# Patient Record
Sex: MALE | Race: WHITE | NOT HISPANIC OR LATINO | URBAN - METROPOLITAN AREA
[De-identification: names, ages, dates, MRNs, and addresses within clinical notes are randomized per-mention and may not be internally consistent; named-entity substitution may affect disease eponyms.]

---

## 2023-06-29 LAB
ALANINE AMINOTRANSFERASE (SGPT) (U/L) IN SER/PLAS: 19 U/L (ref 10–52)
ALBUMIN (G/DL) IN SER/PLAS: 4.1 G/DL (ref 3.4–5)
ALKALINE PHOSPHATASE (U/L) IN SER/PLAS: 52 U/L (ref 33–120)
ANION GAP IN SER/PLAS: 11 MMOL/L (ref 10–20)
ASPARTATE AMINOTRANSFERASE (SGOT) (U/L) IN SER/PLAS: 20 U/L (ref 9–39)
BILIRUBIN TOTAL (MG/DL) IN SER/PLAS: 0.6 MG/DL (ref 0–1.2)
CALCIUM (MG/DL) IN SER/PLAS: 9.2 MG/DL (ref 8.6–10.3)
CARBON DIOXIDE, TOTAL (MMOL/L) IN SER/PLAS: 27 MMOL/L (ref 21–32)
CHLORIDE (MMOL/L) IN SER/PLAS: 105 MMOL/L (ref 98–107)
CHOLESTEROL (MG/DL) IN SER/PLAS: 188 MG/DL (ref 0–199)
CHOLESTEROL IN HDL (MG/DL) IN SER/PLAS: 58 MG/DL
CHOLESTEROL/HDL RATIO: 3.2
CREATININE (MG/DL) IN SER/PLAS: 0.99 MG/DL (ref 0.5–1.3)
ERYTHROCYTE DISTRIBUTION WIDTH (RATIO) BY AUTOMATED COUNT: 13.1 % (ref 11.5–14.5)
ERYTHROCYTE MEAN CORPUSCULAR HEMOGLOBIN CONCENTRATION (G/DL) BY AUTOMATED: 33.4 G/DL (ref 32–36)
ERYTHROCYTE MEAN CORPUSCULAR VOLUME (FL) BY AUTOMATED COUNT: 85 FL (ref 80–100)
ERYTHROCYTES (10*6/UL) IN BLOOD BY AUTOMATED COUNT: 5.06 X10E12/L (ref 4.5–5.9)
ESTIMATED AVERAGE GLUCOSE FOR HBA1C: 108 MG/DL
GFR MALE: 88 ML/MIN/1.73M2
GLUCOSE (MG/DL) IN SER/PLAS: 81 MG/DL (ref 74–99)
HEMATOCRIT (%) IN BLOOD BY AUTOMATED COUNT: 43.1 % (ref 41–52)
HEMOGLOBIN (G/DL) IN BLOOD: 14.4 G/DL (ref 13.5–17.5)
HEMOGLOBIN A1C/HEMOGLOBIN TOTAL IN BLOOD: 5.4 %
LDL: 111 MG/DL (ref 0–99)
LEUKOCYTES (10*3/UL) IN BLOOD BY AUTOMATED COUNT: 6.2 X10E9/L (ref 4.4–11.3)
PLATELETS (10*3/UL) IN BLOOD AUTOMATED COUNT: 240 X10E9/L (ref 150–450)
POTASSIUM (MMOL/L) IN SER/PLAS: 4.1 MMOL/L (ref 3.5–5.3)
PROTEIN TOTAL: 7 G/DL (ref 6.4–8.2)
SODIUM (MMOL/L) IN SER/PLAS: 139 MMOL/L (ref 136–145)
THYROTROPIN (MIU/L) IN SER/PLAS BY DETECTION LIMIT <= 0.05 MIU/L: 2.8 MIU/L (ref 0.44–3.98)
TRIGLYCERIDE (MG/DL) IN SER/PLAS: 97 MG/DL (ref 0–149)
UREA NITROGEN (MG/DL) IN SER/PLAS: 19 MG/DL (ref 6–23)
VLDL: 19 MG/DL (ref 0–40)

## 2023-06-30 NOTE — RESULT ENCOUNTER NOTE
Cholesterol looks okay at 188, HDL 58, , triglycerides 97    Thyroid well within normal limits    Sugar, kidneys, liver, electrolytes are all within normal limits    Hemoglobin A1c within normal limits as well as his complete blood cell count

## 2023-07-02 ASSESSMENT — PROMIS GLOBAL HEALTH SCALE
CARRYOUT_PHYSICAL_ACTIVITIES: COMPLETELY
EMOTIONAL_PROBLEMS: OFTEN
CARRYOUT_SOCIAL_ACTIVITIES: GOOD
RATE_PHYSICAL_HEALTH: GOOD
RATE_SOCIAL_SATISFACTION: GOOD
RATE_AVERAGE_FATIGUE: MODERATE
RATE_MENTAL_HEALTH: FAIR
RATE_AVERAGE_PAIN: 1
RATE_GENERAL_HEALTH: GOOD
RATE_QUALITY_OF_LIFE: GOOD

## 2023-07-03 ENCOUNTER — OFFICE VISIT (OUTPATIENT)
Dept: PRIMARY CARE | Facility: CLINIC | Age: 58
End: 2023-07-03
Payer: COMMERCIAL

## 2023-07-03 VITALS
HEIGHT: 67 IN | BODY MASS INDEX: 25.58 KG/M2 | SYSTOLIC BLOOD PRESSURE: 116 MMHG | HEART RATE: 76 BPM | DIASTOLIC BLOOD PRESSURE: 70 MMHG | WEIGHT: 163 LBS

## 2023-07-03 DIAGNOSIS — Z00.00 HEALTHCARE MAINTENANCE: Primary | ICD-10-CM

## 2023-07-03 DIAGNOSIS — J34.89 NASAL LESION: ICD-10-CM

## 2023-07-03 DIAGNOSIS — I10 HTN (HYPERTENSION), BENIGN: ICD-10-CM

## 2023-07-03 DIAGNOSIS — E78.00 ELEVATED LDL CHOLESTEROL LEVEL: ICD-10-CM

## 2023-07-03 DIAGNOSIS — Z23 NEED FOR SHINGLES VACCINE: ICD-10-CM

## 2023-07-03 PROBLEM — K64.9 HEMORRHOIDS: Status: ACTIVE | Noted: 2023-07-03

## 2023-07-03 LAB
POC APPEARANCE, URINE: CLEAR
POC BILIRUBIN, URINE: NEGATIVE
POC BLOOD, URINE: NEGATIVE
POC COLOR, URINE: YELLOW
POC GLUCOSE, URINE: NEGATIVE MG/DL
POC KETONES, URINE: NEGATIVE MG/DL
POC LEUKOCYTES, URINE: NEGATIVE
POC NITRITE,URINE: NEGATIVE
POC PH, URINE: 6 PH
POC PROTEIN, URINE: NEGATIVE MG/DL
POC SPECIFIC GRAVITY, URINE: <=1.005
POC UROBILINOGEN, URINE: 0.2 EU/DL

## 2023-07-03 PROCEDURE — 99396 PREV VISIT EST AGE 40-64: CPT | Performed by: STUDENT IN AN ORGANIZED HEALTH CARE EDUCATION/TRAINING PROGRAM

## 2023-07-03 PROCEDURE — 93000 ELECTROCARDIOGRAM COMPLETE: CPT | Performed by: STUDENT IN AN ORGANIZED HEALTH CARE EDUCATION/TRAINING PROGRAM

## 2023-07-03 PROCEDURE — 90750 HZV VACC RECOMBINANT IM: CPT | Performed by: STUDENT IN AN ORGANIZED HEALTH CARE EDUCATION/TRAINING PROGRAM

## 2023-07-03 PROCEDURE — 3078F DIAST BP <80 MM HG: CPT | Performed by: STUDENT IN AN ORGANIZED HEALTH CARE EDUCATION/TRAINING PROGRAM

## 2023-07-03 PROCEDURE — 1036F TOBACCO NON-USER: CPT | Performed by: STUDENT IN AN ORGANIZED HEALTH CARE EDUCATION/TRAINING PROGRAM

## 2023-07-03 PROCEDURE — 3074F SYST BP LT 130 MM HG: CPT | Performed by: STUDENT IN AN ORGANIZED HEALTH CARE EDUCATION/TRAINING PROGRAM

## 2023-07-03 PROCEDURE — 90471 IMMUNIZATION ADMIN: CPT | Performed by: STUDENT IN AN ORGANIZED HEALTH CARE EDUCATION/TRAINING PROGRAM

## 2023-07-03 PROCEDURE — 81002 URINALYSIS NONAUTO W/O SCOPE: CPT | Performed by: STUDENT IN AN ORGANIZED HEALTH CARE EDUCATION/TRAINING PROGRAM

## 2023-07-03 PROCEDURE — 99213 OFFICE O/P EST LOW 20 MIN: CPT | Performed by: STUDENT IN AN ORGANIZED HEALTH CARE EDUCATION/TRAINING PROGRAM

## 2023-07-03 RX ORDER — OLMESARTAN MEDOXOMIL AND HYDROCHLOROTHIAZIDE 40/25 40; 25 MG/1; MG/1
1 TABLET ORAL DAILY
COMMUNITY
End: 2023-07-03 | Stop reason: SDUPTHER

## 2023-07-03 RX ORDER — OLMESARTAN MEDOXOMIL AND HYDROCHLOROTHIAZIDE 40/25 40; 25 MG/1; MG/1
1 TABLET ORAL DAILY
Qty: 90 TABLET | Refills: 1 | Status: SHIPPED | OUTPATIENT
Start: 2023-07-03 | End: 2023-11-15 | Stop reason: SDUPTHER

## 2023-07-03 NOTE — PROGRESS NOTES
Subjective   Patient ID: Melvin Kowalski is a 57 y.o. male who presents for Hypertension.  Today he is accompanied by alone.     HPI  Overall patient is doing well.   He has some mild anxiety over the past month due to increased stresses at home, states it is a rough patch with a lot of things going on, should be back to normal in 2 weeks.      Health Maintenance   Immunization: Tdap 03/2021  Influenza vaccine 11/2022   Shingrix willing to update today  COVID-19 vaccine up to date  Colon Cancer Screening: No family history, done 2017, normal per patient next colonoscopy is 2027  Diet: eating well  Exercise: occasional  Tobacco: Denies use  EtOH: Socially, has increased this past month, went on vacation    2. Hypertension  Currently on olmesartan-HCTZ 40-25 mg daily  Denies any side effects of medication  Requesting for refill to be sent to their pharmacy     3. Hyperlipidemia  On no medications, last LDL 6/29/23 was 111, hba1c was 5.4  Had a CT cardiac score with a value of 0 in the past as well    4.  Nasal lesion  Noted a small 2-3 mm nasal lesion  Wishes to discuss this further to make sure is nothing more        Current Outpatient Medications on File Prior to Visit   Medication Sig Dispense Refill    olmesartan-hydrochlorothiazide (BENIcar HCT) 40-25 mg tablet Take 1 tablet by mouth once daily.       No current facility-administered medications on file prior to visit.        No Known Allergies    Immunization History   Administered Date(s) Administered    Influenza, injectable, quadrivalent, preservative free 11/14/2020, 11/12/2022    Influenza, seasonal, injectable 11/08/2006, 11/14/2007    Moderna SARS-CoV-2 Vaccination 03/18/2021, 04/15/2021, 12/01/2021    Novel influenza-H1N1-09, preservative-free 12/22/2009    Pfizer Sars-cov-2 Bivalent 30 mcg/0.3 mL 11/12/2022    Tdap 03/03/2021    Zoster, Recombinant 07/03/2023         Review of Systems  All pertinent positive symptoms are included in the history of present  "illness.  All other systems have been reviewed and are negative and noncontributory to this patient's current ailments.     Objective   /70 (BP Location: Left arm, Patient Position: Sitting, BP Cuff Size: Adult)   Pulse 76   Ht 1.695 m (5' 6.75\")   Wt 73.9 kg (163 lb)   BMI 25.72 kg/m²   BSA: 1.87 meters squared  Office Visit on 07/03/2023   Component Date Value Ref Range Status    POC Color, Urine 07/03/2023 Yellow  Straw, Yellow, Light Yellow Final    POC Appearance, Urine 07/03/2023 Clear  Clear Final    POC Specific Gravity, Urine 07/03/2023 <=1.005  1.005 - 1.035 Final    POC PH, Urine 07/03/2023 6.0  No Reference Range Established PH Final    POC Protein, Urine 07/03/2023 NEGATIVE  NEGATIVE, 30 (1+) mg/dl Final    POC Glucose, Urine 07/03/2023 NEGATIVE  NEGATIVE mg/dl Final    POC Blood, Urine 07/03/2023 NEGATIVE  NEGATIVE Final    POC Ketones, Urine 07/03/2023 NEGATIVE  NEGATIVE mg/dl Final    POC Bilirubin, Urine 07/03/2023 NEGATIVE  NEGATIVE Final    POC Urobilinogen, Urine 07/03/2023 0.2  0.2, 1.0 EU/DL Final    Poc Nitrate, Urine 07/03/2023 NEGATIVE  NEGATIVE Final    POC Leukocytes, Urine 07/03/2023 NEGATIVE  NEGATIVE Final   Orders Only on 06/29/2023   Component Date Value Ref Range Status    WBC 06/29/2023 6.2  4.4 - 11.3 x10E9/L Final    RBC 06/29/2023 5.06  4.50 - 5.90 x10E12/L Final    Hemoglobin 06/29/2023 14.4  13.5 - 17.5 g/dL Final    Hematocrit 06/29/2023 43.1  41.0 - 52.0 % Final    MCV 06/29/2023 85  80 - 100 fL Final    MCHC 06/29/2023 33.4  32.0 - 36.0 g/dL Final    Platelets 06/29/2023 240  150 - 450 x10E9/L Final    RDW 06/29/2023 13.1  11.5 - 14.5 % Final    Hemoglobin A1C 06/29/2023 5.4  % Final    Comment:      Diagnosis of Diabetes-Adults   Non-Diabetic: < or = 5.6%   Increased risk for developing diabetes: 5.7-6.4%   Diagnostic of diabetes: > or = 6.5%  .       Monitoring of Diabetes                Age (y)     Therapeutic Goal (%)   Adults:          >18           <7.0   " Pediatrics:    13-18           <7.5                   7-12           <8.0                   0- 6            7.5-8.5   American Diabetes Association. Diabetes Care 33(S1), Jan 2010.      Estimated Average Glucose 06/29/2023 108  MG/DL Final    Glucose 06/29/2023 81  74 - 99 mg/dL Final    Sodium 06/29/2023 139  136 - 145 mmol/L Final    Potassium 06/29/2023 4.1  3.5 - 5.3 mmol/L Final    Chloride 06/29/2023 105  98 - 107 mmol/L Final    Bicarbonate 06/29/2023 27  21 - 32 mmol/L Final    Anion Gap 06/29/2023 11  10 - 20 mmol/L Final    Urea Nitrogen 06/29/2023 19  6 - 23 mg/dL Final    Creatinine 06/29/2023 0.99  0.50 - 1.30 mg/dL Final    GFR MALE 06/29/2023 88  >90 mL/min/1.73m2 Final    Comment:  CALCULATIONS OF ESTIMATED GFR ARE PERFORMED   USING THE 2021 CKD-EPI STUDY REFIT EQUATION   WITHOUT THE RACE VARIABLE FOR THE IDMS-TRACEABLE   CREATININE METHODS.    https://jasn.asnjournals.org/content/early/2021/09/22/ASN.2169351427      Calcium 06/29/2023 9.2  8.6 - 10.3 mg/dL Final    Albumin 06/29/2023 4.1  3.4 - 5.0 g/dL Final    Alkaline Phosphatase 06/29/2023 52  33 - 120 U/L Final    Total Protein 06/29/2023 7.0  6.4 - 8.2 g/dL Final    AST 06/29/2023 20  9 - 39 U/L Final    Total Bilirubin 06/29/2023 0.6  0.0 - 1.2 mg/dL Final    ALT (SGPT) 06/29/2023 19  10 - 52 U/L Final    Comment:  Patients treated with Sulfasalazine may generate    falsely decreased results for ALT.      TSH 06/29/2023 2.80  0.44 - 3.98 mIU/L Final    Comment:  TSH testing is performed using different testing    methodology at [facility] than at other    [facility] hospitals. Direct result comparisons should    only be made within the same method.      Cholesterol 06/29/2023 188  0 - 199 mg/dL Final    Comment: .      AGE      DESIRABLE   BORDERLINE HIGH   HIGH     0-19 Y     0 - 169       170 - 199     >/= 200    20-24 Y     0 - 189       190 - 224     >/= 225         >24 Y     0 - 199       200 - 239     >/= 240   **All ranges  are based on fasting samples. Specific   therapeutic targets will vary based on patient-specific   cardiac risk.  .   Pediatric guidelines reference:Pediatrics 2011, 128(S5).   Adult guidelines reference: NCEP ATPIII Guidelines,     DAVID 2001, 258:2486-97  .   Venipuncture immediately after or during the    administration of Metamizole may lead to falsely   low results. Testing should be performed immediately   prior to Metamizole dosing.      HDL 06/29/2023 58.0  mg/dL Final    Comment: .      AGE      VERY LOW   LOW     NORMAL    HIGH       0-19 Y       < 35   < 40     40-45     ----    20-24 Y       ----   < 40       >45     ----      >24 Y       ----   < 40     40-60      >60  .      Cholesterol/HDL Ratio 06/29/2023 3.2   Final    Comment: REF VALUES  DESIRABLE  < 3.4  HIGH RISK  > 5.0      LDL 06/29/2023 111 (H)  0 - 99 mg/dL Final    Comment: .                           NEAR      BORD      AGE      DESIRABLE  OPTIMAL    HIGH     HIGH     VERY HIGH     0-19 Y     0 - 109     ---    110-129   >/= 130     ----    20-24 Y     0 - 119     ---    120-159   >/= 160     ----      >24 Y     0 -  99   100-129  130-159   160-189     >/=190  .      VLDL 06/29/2023 19  0 - 40 mg/dL Final    Triglycerides 06/29/2023 97  0 - 149 mg/dL Final    Comment: .      AGE      DESIRABLE   BORDERLINE HIGH   HIGH     VERY HIGH   0 D-90 D    19 - 174         ----         ----        ----  91 D- 9 Y     0 -  74        75 -  99     >/= 100      ----    10-19 Y     0 -  89        90 - 129     >/= 130      ----    20-24 Y     0 - 114       115 - 149     >/= 150      ----         >24 Y     0 - 149       150 - 199    200- 499    >/= 500  .   Venipuncture immediately after or during the    administration of Metamizole may lead to falsely   low results. Testing should be performed immediately   prior to Metamizole dosing.         Physical Exam  CONSTITUTIONAL - well nourished, well developed, looks like stated age, in no acute distress, not  "ill-appearing, and not tired appearing  SKIN - normal skin color and pigmentation, normal skin turgor without rash, 2-3 mm skin lesion noted on top of nose that appear to be \"stuck on\" but slightly pearly  HEAD - no trauma, normocephalic  EYES - pupils are equal and reactive to light, extraocular muscles are intact, and normal external exam  ENT - TM's intact, no injection, no signs of infection, uvula midline, normal tongue movement and throat normal, no exudate, nasal passage without discharge and patent  NECK - supple without rigidity, no neck mass was observed, no thyromegaly or thyroid nodules  CHEST - clear to auscultation, no wheezing, no crackles and no rales, good effort  CARDIAC - regular rate and regular rhythm, no skipped beats, no murmur  ABDOMEN - no organomegaly, soft, nontender, nondistended, no guarding/rebound/rigidity  EXTREMITIES - no edema, no deformities  NEUROLOGICAL - normal gait, normal balance, normal motor, no ataxia, alert, oriented and no focal signs  PSYCHIATRIC - alert, pleasant and cordial, age-appropriate  IMMUNOLOGIC - no cervical lymphadenopathy     Assessment/Plan   1. Health Maintenance   Complete history and physical examination was performed  EKG reveals normal sinus rhythm without acute changes  We will notify of test results once available and make treatment recommendations accordingly  Colon cancer screening due 2027  Shingles vaccine #1 was provided today and #2 will be provided within the next 2-6 months    2. Hypertension  Continue on olmesartan-hydrochlorothiazide 40-25mg  Your prescription has been sent to your pharmacy  I would like to have you monitor and record blood pressures at home   Blood pressure goal should be below 130/80, ideally 120/80     3. Hyperlipidemia  On no medications, last LDL 6/29/23 was 111, hba1c was 5.4  We will continue monitoring closely  CT cardiac score noted a value of 0 in the past    4. Nasal Lesion  A requisition for dermatology was " placed in your chart  Please make appointment at your earliest mutual convenience

## 2023-11-12 ASSESSMENT — ENCOUNTER SYMPTOMS
ORTHOPNEA: 0
PND: 0
HYPERTENSION: 1
SWEATS: 0
NECK PAIN: 0
SHORTNESS OF BREATH: 0
HEADACHES: 0
PALPITATIONS: 0
BLURRED VISION: 0

## 2023-11-15 ENCOUNTER — OFFICE VISIT (OUTPATIENT)
Dept: PRIMARY CARE | Facility: CLINIC | Age: 58
End: 2023-11-15
Payer: COMMERCIAL

## 2023-11-15 VITALS
DIASTOLIC BLOOD PRESSURE: 70 MMHG | BODY MASS INDEX: 26.34 KG/M2 | HEIGHT: 67 IN | HEART RATE: 96 BPM | WEIGHT: 167.8 LBS | SYSTOLIC BLOOD PRESSURE: 118 MMHG | OXYGEN SATURATION: 98 %

## 2023-11-15 DIAGNOSIS — Z23 INFLUENZA VACCINE NEEDED: Primary | ICD-10-CM

## 2023-11-15 DIAGNOSIS — E78.00 ELEVATED LDL CHOLESTEROL LEVEL: ICD-10-CM

## 2023-11-15 DIAGNOSIS — I10 HTN (HYPERTENSION), BENIGN: ICD-10-CM

## 2023-11-15 DIAGNOSIS — Z23 NEED FOR SHINGLES VACCINE: ICD-10-CM

## 2023-11-15 PROCEDURE — 99214 OFFICE O/P EST MOD 30 MIN: CPT | Performed by: STUDENT IN AN ORGANIZED HEALTH CARE EDUCATION/TRAINING PROGRAM

## 2023-11-15 PROCEDURE — 90471 IMMUNIZATION ADMIN: CPT | Performed by: STUDENT IN AN ORGANIZED HEALTH CARE EDUCATION/TRAINING PROGRAM

## 2023-11-15 PROCEDURE — 3074F SYST BP LT 130 MM HG: CPT | Performed by: STUDENT IN AN ORGANIZED HEALTH CARE EDUCATION/TRAINING PROGRAM

## 2023-11-15 PROCEDURE — 90750 HZV VACC RECOMBINANT IM: CPT | Performed by: STUDENT IN AN ORGANIZED HEALTH CARE EDUCATION/TRAINING PROGRAM

## 2023-11-15 PROCEDURE — 90472 IMMUNIZATION ADMIN EACH ADD: CPT | Performed by: STUDENT IN AN ORGANIZED HEALTH CARE EDUCATION/TRAINING PROGRAM

## 2023-11-15 PROCEDURE — 90686 IIV4 VACC NO PRSV 0.5 ML IM: CPT | Performed by: STUDENT IN AN ORGANIZED HEALTH CARE EDUCATION/TRAINING PROGRAM

## 2023-11-15 PROCEDURE — 1036F TOBACCO NON-USER: CPT | Performed by: STUDENT IN AN ORGANIZED HEALTH CARE EDUCATION/TRAINING PROGRAM

## 2023-11-15 PROCEDURE — 3078F DIAST BP <80 MM HG: CPT | Performed by: STUDENT IN AN ORGANIZED HEALTH CARE EDUCATION/TRAINING PROGRAM

## 2023-11-15 RX ORDER — OLMESARTAN MEDOXOMIL AND HYDROCHLOROTHIAZIDE 40/25 40; 25 MG/1; MG/1
1 TABLET ORAL DAILY
Qty: 90 TABLET | Refills: 1 | Status: SHIPPED | OUTPATIENT
Start: 2023-11-15 | End: 2024-04-29 | Stop reason: SDUPTHER

## 2023-11-15 ASSESSMENT — PATIENT HEALTH QUESTIONNAIRE - PHQ9
2. FEELING DOWN, DEPRESSED OR HOPELESS: NOT AT ALL
SUM OF ALL RESPONSES TO PHQ9 QUESTIONS 1 AND 2: 0
1. LITTLE INTEREST OR PLEASURE IN DOING THINGS: NOT AT ALL

## 2023-11-15 ASSESSMENT — ENCOUNTER SYMPTOMS
SWEATS: 0
PALPITATIONS: 0
HEADACHES: 0
ORTHOPNEA: 0
HYPERTENSION: 1
BLURRED VISION: 0
PND: 0
NECK PAIN: 0
SHORTNESS OF BREATH: 0

## 2023-11-15 NOTE — PROGRESS NOTES
Answers submitted by the patient for this visit:  High Blood Pressure Questionnaire (Submitted on 11/12/2023)  Chief Complaint: Hypertension  Chronicity: recurrent  Onset: more than 1 year ago  Progression since onset: rapidly improving  Condition status: controlled  anxiety: No  blurred vision: No  chest pain: No  headaches: No  malaise/fatigue: No  neck pain: No  orthopnea: No  palpitations: No  peripheral edema: No  PND: No  shortness of breath: No  sweats: No  Agents associated with hypertension: no associated agents  CAD risks: no known risk factors  Compliance problems: no compliance problems  Subjective   Patient ID: Melvin Kowalski is a 58 y.o. male who presents for Hypertension and Hyperlipidemia.  Today he is accompanied by alone.     Hypertension  This is a recurrent problem. The current episode started more than 1 year ago. The problem has been rapidly improving since onset. The problem is controlled. Pertinent negatives include no anxiety, blurred vision, chest pain, headaches, malaise/fatigue, neck pain, orthopnea, palpitations, peripheral edema, PND, shortness of breath or sweats. There are no associated agents to hypertension. There are no known risk factors for coronary artery disease. There are no compliance problems.        1. Influenza and shingles vaccine need  Willing and wishing to update his influenza vaccine and his second shingles vaccine at today's visit    2. Hypertension  Currently on olmesartan-HCTZ 40-25 mg daily  Denies any side effects of medication  Requesting for refill to be sent to their pharmacy     3. Hyperlipidemia  On no medications, last LDL 6/29/23 was 111, hba1c was 5.4  Had a CT cardiac score with a value of 0 in the past as well            Current Outpatient Medications on File Prior to Visit   Medication Sig Dispense Refill    olmesartan-hydrochlorothiazide (BENIcar HCT) 40-25 mg tablet Take 1 tablet by mouth once daily. 90 tablet 1     No current facility-administered  "medications on file prior to visit.        No Known Allergies    Immunization History   Administered Date(s) Administered    Flu vaccine (IIV4), preservative free *Check age/dose* 11/14/2020, 11/12/2022    Influenza, seasonal, injectable 11/08/2006, 11/14/2007    Moderna SARS-CoV-2 Vaccination 03/18/2021, 04/15/2021, 12/01/2021    Novel influenza-H1N1-09, preservative-free 12/22/2009    Pfizer COVID-19 vaccine, bivalent, age 12 years and older (30 mcg/0.3 mL) 11/12/2022    Tdap vaccine, age 7 year and older (BOOSTRIX) 03/03/2021    Zoster vaccine, recombinant, adult (SHINGRIX) 07/03/2023         Review of Systems   Constitutional:  Negative for malaise/fatigue.   Eyes:  Negative for blurred vision.   Respiratory:  Negative for shortness of breath.    Cardiovascular:  Negative for chest pain, palpitations, orthopnea and PND.   Musculoskeletal:  Negative for neck pain.   Neurological:  Negative for headaches.     All pertinent positive symptoms are included in the history of present illness.  All other systems have been reviewed and are negative and noncontributory to this patient's current ailments.     Objective   /70 (BP Location: Left arm, Patient Position: Sitting, BP Cuff Size: Adult)   Pulse 96   Ht 1.695 m (5' 6.75\")   Wt 76.1 kg (167 lb 12.8 oz)   SpO2 98%   BMI 26.48 kg/m²   BSA: 1.89 meters squared  No visits with results within 1 Month(s) from this visit.   Latest known visit with results is:   Office Visit on 07/03/2023   Component Date Value Ref Range Status    POC Color, Urine 07/03/2023 Yellow  Straw, Yellow, Light Yellow Final    POC Appearance, Urine 07/03/2023 Clear  Clear Final    POC Specific Gravity, Urine 07/03/2023 <=1.005  1.005 - 1.035 Final    POC PH, Urine 07/03/2023 6.0  No Reference Range Established PH Final    POC Protein, Urine 07/03/2023 NEGATIVE  NEGATIVE, 30 (1+) mg/dl Final    POC Glucose, Urine 07/03/2023 NEGATIVE  NEGATIVE mg/dl Final    POC Blood, Urine 07/03/2023 " NEGATIVE  NEGATIVE Final    POC Ketones, Urine 07/03/2023 NEGATIVE  NEGATIVE mg/dl Final    POC Bilirubin, Urine 07/03/2023 NEGATIVE  NEGATIVE Final    POC Urobilinogen, Urine 07/03/2023 0.2  0.2, 1.0 EU/DL Final    Poc Nitrite, Urine 07/03/2023 NEGATIVE  NEGATIVE Final    POC Leukocytes, Urine 07/03/2023 NEGATIVE  NEGATIVE Final       Physical Exam  CONSTITUTIONAL - well nourished, well developed, looks like stated age, in no acute distress, not ill-appearing, and not tired appearing  SKIN - normal skin color and pigmentation, normal skin turgor without rash, lesions, or nodules visualized  HEAD - no trauma, normocephalic  EYES - normal external exam  CHEST -no distressed breathing, good effort, chest clear to auscultation bilaterally, heart regular rate and rhythm  EXTREMITIES - no edema, no deformities  NEUROLOGICAL - normal balance, normal motor, no ataxia  PSYCHIATRIC - alert, pleasant and cordial, age-appropriate    Assessment/Plan   1.  Flu vaccine and shingles vaccines needed  All questions were answered and you were counseled on immunization(s) in detail and vaccinations were provided    2. Hypertension  Continue on olmesartan-hydrochlorothiazide 40-25mg  Your prescription has been sent to your pharmacy  I would like to have you monitor and record blood pressures at home   Blood pressure goal should be below 130/80, ideally 120/80     3. Hyperlipidemia  On no medications, last LDL 6/29/23 was 111, hba1c was 5.4  We will continue monitoring closely  CT cardiac score noted a value of 0 in the past

## 2024-04-29 ENCOUNTER — OFFICE VISIT (OUTPATIENT)
Dept: PRIMARY CARE | Facility: CLINIC | Age: 59
End: 2024-04-29
Payer: COMMERCIAL

## 2024-04-29 VITALS
BODY MASS INDEX: 26.84 KG/M2 | HEIGHT: 67 IN | DIASTOLIC BLOOD PRESSURE: 70 MMHG | SYSTOLIC BLOOD PRESSURE: 128 MMHG | HEART RATE: 77 BPM | WEIGHT: 171 LBS | OXYGEN SATURATION: 96 %

## 2024-04-29 DIAGNOSIS — K13.0 LIP DRYNESS: ICD-10-CM

## 2024-04-29 DIAGNOSIS — I10 HTN (HYPERTENSION), BENIGN: Primary | ICD-10-CM

## 2024-04-29 DIAGNOSIS — E78.00 ELEVATED LDL CHOLESTEROL LEVEL: ICD-10-CM

## 2024-04-29 PROCEDURE — 99214 OFFICE O/P EST MOD 30 MIN: CPT | Performed by: STUDENT IN AN ORGANIZED HEALTH CARE EDUCATION/TRAINING PROGRAM

## 2024-04-29 PROCEDURE — 3078F DIAST BP <80 MM HG: CPT | Performed by: STUDENT IN AN ORGANIZED HEALTH CARE EDUCATION/TRAINING PROGRAM

## 2024-04-29 PROCEDURE — 1036F TOBACCO NON-USER: CPT | Performed by: STUDENT IN AN ORGANIZED HEALTH CARE EDUCATION/TRAINING PROGRAM

## 2024-04-29 PROCEDURE — 3074F SYST BP LT 130 MM HG: CPT | Performed by: STUDENT IN AN ORGANIZED HEALTH CARE EDUCATION/TRAINING PROGRAM

## 2024-04-29 RX ORDER — CLOTRIMAZOLE AND BETAMETHASONE DIPROPIONATE 10; .64 MG/G; MG/G
1 CREAM TOPICAL 2 TIMES DAILY
Qty: 15 G | Refills: 0 | Status: SHIPPED | OUTPATIENT
Start: 2024-04-29

## 2024-04-29 RX ORDER — OLMESARTAN MEDOXOMIL AND HYDROCHLOROTHIAZIDE 40/25 40; 25 MG/1; MG/1
1 TABLET ORAL DAILY
Qty: 90 TABLET | Refills: 1 | Status: SHIPPED | OUTPATIENT
Start: 2024-04-29

## 2024-04-29 ASSESSMENT — PATIENT HEALTH QUESTIONNAIRE - PHQ9
SUM OF ALL RESPONSES TO PHQ9 QUESTIONS 1 AND 2: 0
2. FEELING DOWN, DEPRESSED OR HOPELESS: NOT AT ALL
1. LITTLE INTEREST OR PLEASURE IN DOING THINGS: NOT AT ALL

## 2024-04-29 ASSESSMENT — PROMIS GLOBAL HEALTH SCALE
RATE_AVERAGE_PAIN: 5
RATE_MENTAL_HEALTH: GOOD
RATE_GENERAL_HEALTH: GOOD
RATE_PHYSICAL_HEALTH: GOOD
RATE_AVERAGE_FATIGUE: MILD
RATE_SOCIAL_SATISFACTION: FAIR
CARRYOUT_SOCIAL_ACTIVITIES: GOOD
EMOTIONAL_PROBLEMS: SOMETIMES
RATE_QUALITY_OF_LIFE: VERY GOOD
CARRYOUT_PHYSICAL_ACTIVITIES: COMPLETELY

## 2024-04-29 NOTE — PROGRESS NOTES
"Subjective   Patient ID: Melvin Kowalski is a 58 y.o. male who presents for Hypertension and Dry Skin/Irritation of Lip.    HPI   Hypertension  Patient takes olmesartan-hydrochlorothiazide 40-25 mg daily, tolerating well  Blood pressure was well within normal limits at 128/70  Requesting refills to be sent to his local pharmacy    Hyperlipidemia  Last lipid panel showed cholesterol 188,  and triglycerides 97  Currently not taking any cholesterol-lowering medications at this time  CT cardiac score showed a value of 0 back in September 2021    3.  Lip dryness  Has been noticing some cracking in his lips as well as some dryness just below the lip itself  Did go on vacation recently and had a lot of wind blowing on it and even noted irritation shortly thereafter  Continues to try to use Chapstick as well as over-the-counter Vaseline with no major relief  Stated that this also was irritated on the corner of the lip slightly  Wishes to discuss this further      Review of Systems  All pertinent positive symptoms are included in the history of present illness.    All other systems have been reviewed and are negative and noncontributory to this patient's current ailments.  Objective   /70 (BP Location: Left arm, Patient Position: Sitting, BP Cuff Size: Adult)   Pulse 77   Ht 1.695 m (5' 6.75\")   Wt 77.6 kg (171 lb)   SpO2 96%   BMI 26.98 kg/m²     Physical Exam  CONSTITUTIONAL - well nourished, well developed, looks like stated age, in no acute distress, not ill-appearing, and not tired appearing  SKIN - normal skin color and pigmentation, normal skin turgor without rash, lesions, or nodules visualized, noted dryness on the bottom portion of the lip, slight cracking  HEAD - no trauma, normocephalic  EYES - normal external exam  CHEST -no distressed breathing, good effort  EXTREMITIES - no edema, no deformities  NEUROLOGICAL - normal balance, normal motor, no ataxia  PSYCHIATRIC - alert, pleasant and cordial, " age-appropriate    Assessment/Plan   Diagnoses and all orders for this visit:  HTN (hypertension), benign  Blood pressure well-controlled this moment  Will continue olmesartan-hydrochlorothiazide 40-25 mg daily  Refills were sent to the pharmacy  I would like to have you monitor and record blood pressures at home  Blood pressure goal should be below 130/80, ideally 120/80    Elevated LDL cholesterol level  ASCVD risk score is 6.2%, does not require any statin medication for the moment  Emphasizing the importance of following a low-fat diet and exercise regularly as measurements to prevent future cardiovascular events  The 10-year ASCVD risk score (Nunu VARELA, et al., 2019) is: 7.1%    Values used to calculate the score:      Age: 58 years      Sex: Male      Is Non- : No      Diabetic: No      Tobacco smoker: No      Systolic Blood Pressure: 128 mmHg      Is BP treated: Yes      HDL Cholesterol: 58 mg/dL      Total Cholesterol: 188 mg/dL    Dry lip  We had a long conversation about your signs/symptoms  This could even be something similar to an angular cheilitis  I provided Lotrisone cream to be used very minimally twice daily  I would recommend you only do this for a max of 3-5 days  If this continues to be an issue, the next step would be following up with dermatology    Thank you for letting us be a part of your care team.  Please call the office if you have further questions or concerns regarding your care    Otherwise, please follow-up in 3-6 months for continued care and refills as well as your yearly physical examination

## 2024-11-06 DIAGNOSIS — I10 HTN (HYPERTENSION), BENIGN: ICD-10-CM

## 2024-11-06 RX ORDER — OLMESARTAN MEDOXOMIL AND HYDROCHLOROTHIAZIDE 40/25 40; 25 MG/1; MG/1
1 TABLET ORAL DAILY
Qty: 30 TABLET | Refills: 0 | Status: SHIPPED | OUTPATIENT
Start: 2024-11-06

## 2024-11-14 DIAGNOSIS — Z00.00 HEALTHCARE MAINTENANCE: ICD-10-CM

## 2024-11-18 ENCOUNTER — LAB (OUTPATIENT)
Dept: LAB | Facility: LAB | Age: 59
End: 2024-11-18
Payer: COMMERCIAL

## 2024-11-18 ENCOUNTER — APPOINTMENT (OUTPATIENT)
Dept: LAB | Facility: LAB | Age: 59
End: 2024-11-18
Payer: COMMERCIAL

## 2024-11-18 DIAGNOSIS — Z00.00 HEALTHCARE MAINTENANCE: ICD-10-CM

## 2024-11-18 LAB
ALBUMIN SERPL BCP-MCNC: 4 G/DL (ref 3.4–5)
ALP SERPL-CCNC: 56 U/L (ref 33–120)
ALT SERPL W P-5'-P-CCNC: 20 U/L (ref 10–52)
ANION GAP SERPL CALC-SCNC: 12 MMOL/L (ref 10–20)
AST SERPL W P-5'-P-CCNC: 18 U/L (ref 9–39)
BASOPHILS # BLD AUTO: 0.07 X10*3/UL (ref 0–0.1)
BASOPHILS NFR BLD AUTO: 1.1 %
BILIRUB SERPL-MCNC: 0.6 MG/DL (ref 0–1.2)
BUN SERPL-MCNC: 20 MG/DL (ref 6–23)
CALCIUM SERPL-MCNC: 9 MG/DL (ref 8.6–10.3)
CHLORIDE SERPL-SCNC: 101 MMOL/L (ref 98–107)
CHOLEST SERPL-MCNC: 184 MG/DL (ref 0–199)
CHOLESTEROL/HDL RATIO: 2.9
CO2 SERPL-SCNC: 29 MMOL/L (ref 21–32)
CREAT SERPL-MCNC: 1.03 MG/DL (ref 0.5–1.3)
EGFRCR SERPLBLD CKD-EPI 2021: 84 ML/MIN/1.73M*2
EOSINOPHIL # BLD AUTO: 0.15 X10*3/UL (ref 0–0.7)
EOSINOPHIL NFR BLD AUTO: 2.4 %
ERYTHROCYTE [DISTWIDTH] IN BLOOD BY AUTOMATED COUNT: 15.4 % (ref 11.5–14.5)
EST. AVERAGE GLUCOSE BLD GHB EST-MCNC: 111 MG/DL
GLUCOSE SERPL-MCNC: 72 MG/DL (ref 74–99)
HBA1C MFR BLD: 5.5 %
HCT VFR BLD AUTO: 40.1 % (ref 41–52)
HDLC SERPL-MCNC: 63.2 MG/DL
HGB BLD-MCNC: 12.9 G/DL (ref 13.5–17.5)
IMM GRANULOCYTES # BLD AUTO: 0.02 X10*3/UL (ref 0–0.7)
IMM GRANULOCYTES NFR BLD AUTO: 0.3 % (ref 0–0.9)
LDLC SERPL CALC-MCNC: 106 MG/DL
LYMPHOCYTES # BLD AUTO: 1.85 X10*3/UL (ref 1.2–4.8)
LYMPHOCYTES NFR BLD AUTO: 29.4 %
MCH RBC QN AUTO: 26.5 PG (ref 26–34)
MCHC RBC AUTO-ENTMCNC: 32.2 G/DL (ref 32–36)
MCV RBC AUTO: 82 FL (ref 80–100)
MONOCYTES # BLD AUTO: 0.68 X10*3/UL (ref 0.1–1)
MONOCYTES NFR BLD AUTO: 10.8 %
NEUTROPHILS # BLD AUTO: 3.53 X10*3/UL (ref 1.2–7.7)
NEUTROPHILS NFR BLD AUTO: 56 %
NON HDL CHOLESTEROL: 121 MG/DL (ref 0–149)
NRBC BLD-RTO: 0 /100 WBCS (ref 0–0)
PLATELET # BLD AUTO: 273 X10*3/UL (ref 150–450)
POTASSIUM SERPL-SCNC: 4 MMOL/L (ref 3.5–5.3)
PROT SERPL-MCNC: 6.7 G/DL (ref 6.4–8.2)
PSA SERPL-MCNC: 4.15 NG/ML
RBC # BLD AUTO: 4.87 X10*6/UL (ref 4.5–5.9)
SODIUM SERPL-SCNC: 138 MMOL/L (ref 136–145)
TRIGL SERPL-MCNC: 75 MG/DL (ref 0–149)
TSH SERPL-ACNC: 2.32 MIU/L (ref 0.44–3.98)
VLDL: 15 MG/DL (ref 0–40)
WBC # BLD AUTO: 6.3 X10*3/UL (ref 4.4–11.3)

## 2024-11-18 PROCEDURE — 80053 COMPREHEN METABOLIC PANEL: CPT

## 2024-11-18 PROCEDURE — 84443 ASSAY THYROID STIM HORMONE: CPT

## 2024-11-18 PROCEDURE — 84153 ASSAY OF PSA TOTAL: CPT

## 2024-11-18 PROCEDURE — 36415 COLL VENOUS BLD VENIPUNCTURE: CPT

## 2024-11-18 PROCEDURE — 80061 LIPID PANEL: CPT

## 2024-11-18 PROCEDURE — 85025 COMPLETE CBC W/AUTO DIFF WBC: CPT

## 2024-11-18 PROCEDURE — 83036 HEMOGLOBIN GLYCOSYLATED A1C: CPT

## 2024-11-19 DIAGNOSIS — R97.20 ELEVATED PSA MEASUREMENT: Primary | ICD-10-CM

## 2024-11-19 DIAGNOSIS — D64.9 ANEMIA, UNSPECIFIED TYPE: ICD-10-CM

## 2024-11-19 NOTE — RESULT ENCOUNTER NOTE
Cholesterol noted stability at 184, HDL 63, , triglycerides 75    Sugar is low at 72 but otherwise liver, kidneys, electrolytes are all within normal limits    Prostate specific antigen is elevated at 4.15    We will repeat this in the near future and if this continues to be elevated we will discuss a urology referral    Complete blood cell count does show a slight anemia at 12.9 and 40.1 respectively so we will also repeat this in the near future    Thyroid well within normal limits alongside a normal hemoglobin A1c

## 2024-11-20 ENCOUNTER — APPOINTMENT (OUTPATIENT)
Dept: PRIMARY CARE | Facility: CLINIC | Age: 59
End: 2024-11-20
Payer: COMMERCIAL

## 2024-11-20 VITALS
HEIGHT: 67 IN | SYSTOLIC BLOOD PRESSURE: 110 MMHG | BODY MASS INDEX: 26.12 KG/M2 | HEART RATE: 76 BPM | OXYGEN SATURATION: 98 % | WEIGHT: 166.4 LBS | DIASTOLIC BLOOD PRESSURE: 70 MMHG

## 2024-11-20 DIAGNOSIS — R06.83 SNORING: ICD-10-CM

## 2024-11-20 DIAGNOSIS — G89.29 CHRONIC RIGHT SHOULDER PAIN: ICD-10-CM

## 2024-11-20 DIAGNOSIS — E78.00 ELEVATED LDL CHOLESTEROL LEVEL: ICD-10-CM

## 2024-11-20 DIAGNOSIS — I10 HTN (HYPERTENSION), BENIGN: ICD-10-CM

## 2024-11-20 DIAGNOSIS — Z23 FLU VACCINE NEED: ICD-10-CM

## 2024-11-20 DIAGNOSIS — M25.511 CHRONIC RIGHT SHOULDER PAIN: ICD-10-CM

## 2024-11-20 DIAGNOSIS — Z00.00 HEALTHCARE MAINTENANCE: Primary | ICD-10-CM

## 2024-11-20 DIAGNOSIS — R97.20 ELEVATED PSA MEASUREMENT: ICD-10-CM

## 2024-11-20 PROCEDURE — 3078F DIAST BP <80 MM HG: CPT | Performed by: STUDENT IN AN ORGANIZED HEALTH CARE EDUCATION/TRAINING PROGRAM

## 2024-11-20 PROCEDURE — 99214 OFFICE O/P EST MOD 30 MIN: CPT | Performed by: STUDENT IN AN ORGANIZED HEALTH CARE EDUCATION/TRAINING PROGRAM

## 2024-11-20 PROCEDURE — 3074F SYST BP LT 130 MM HG: CPT | Performed by: STUDENT IN AN ORGANIZED HEALTH CARE EDUCATION/TRAINING PROGRAM

## 2024-11-20 PROCEDURE — 93000 ELECTROCARDIOGRAM COMPLETE: CPT | Performed by: STUDENT IN AN ORGANIZED HEALTH CARE EDUCATION/TRAINING PROGRAM

## 2024-11-20 PROCEDURE — 90471 IMMUNIZATION ADMIN: CPT | Performed by: STUDENT IN AN ORGANIZED HEALTH CARE EDUCATION/TRAINING PROGRAM

## 2024-11-20 PROCEDURE — 99396 PREV VISIT EST AGE 40-64: CPT | Performed by: STUDENT IN AN ORGANIZED HEALTH CARE EDUCATION/TRAINING PROGRAM

## 2024-11-20 PROCEDURE — 90656 IIV3 VACC NO PRSV 0.5 ML IM: CPT | Performed by: STUDENT IN AN ORGANIZED HEALTH CARE EDUCATION/TRAINING PROGRAM

## 2024-11-20 PROCEDURE — 3008F BODY MASS INDEX DOCD: CPT | Performed by: STUDENT IN AN ORGANIZED HEALTH CARE EDUCATION/TRAINING PROGRAM

## 2024-11-20 PROCEDURE — 1036F TOBACCO NON-USER: CPT | Performed by: STUDENT IN AN ORGANIZED HEALTH CARE EDUCATION/TRAINING PROGRAM

## 2024-11-20 RX ORDER — OLMESARTAN MEDOXOMIL AND HYDROCHLOROTHIAZIDE 40/25 40; 25 MG/1; MG/1
1 TABLET ORAL DAILY
Qty: 90 TABLET | Refills: 1 | Status: SHIPPED | OUTPATIENT
Start: 2024-11-20

## 2024-11-20 ASSESSMENT — PATIENT HEALTH QUESTIONNAIRE - PHQ9
1. LITTLE INTEREST OR PLEASURE IN DOING THINGS: NOT AT ALL
2. FEELING DOWN, DEPRESSED OR HOPELESS: NOT AT ALL
SUM OF ALL RESPONSES TO PHQ9 QUESTIONS 1 AND 2: 0

## 2024-11-20 ASSESSMENT — PROMIS GLOBAL HEALTH SCALE
RATE_PHYSICAL_HEALTH: GOOD
RATE_AVERAGE_PAIN: 4
EMOTIONAL_PROBLEMS: OFTEN
CARRYOUT_SOCIAL_ACTIVITIES: GOOD
RATE_GENERAL_HEALTH: VERY GOOD
RATE_SOCIAL_SATISFACTION: GOOD
CARRYOUT_PHYSICAL_ACTIVITIES: MOSTLY
RATE_QUALITY_OF_LIFE: GOOD
RATE_MENTAL_HEALTH: FAIR
RATE_AVERAGE_FATIGUE: MILD

## 2024-11-20 NOTE — PROGRESS NOTES
Subjective   Patient ID: Melvin Kowalski is a 59 y.o. male who presents for Annual Exam and Shoulder Pain.  Today he is accompanied by alone.         Health Maintenance   Immunization: Tdap 03/2021  Influenza vaccine  today   Shingrix UTD  COVID-19 vaccine up to date  Colon Cancer Screening: No family history, done 2017, normal per patient next colonoscopy is 2027  Diet: eating well  Exercise: occasional  Tobacco: Denies use  EtOH: Socially      Hypertension  Patient takes olmesartan-hydrochlorothiazide 40-25 mg daily, tolerating well  Blood pressure was well within normal limits at 110/70  Requesting refills to be sent to his local pharmacy  Denies any cardiopulmonary symptoms     Hyperlipidemia  Last lipid panel showed cholesterol  Currently not taking any cholesterol-lowering medications at this time  CT cardiac score showed a value of 0 back in September 2021      3.  Elevated PSA  PSA was slightly elevated before today's visit  Currently asymptomatic  Willing wishing to get this done in the near future    4.  Anemia  H&H was low and he denies any signs/symptoms  Did state that he did donate blood within the past week  Believes that it is all due for this    5.  Right shoulder pain  Has been noticing increased signs/symptoms of right sided shoulder pain especially limiting his range of motion  All this is localized around his AC joint  There is no major improvement with exercise  Asking what would be the next step at this time    6.  Snoring  States that he has been noticing signs/symptoms of snoring at night  Stated that there have been no witnessed apneic episodes  Wife states that he should discuss this with myself today        Current Outpatient Medications on File Prior to Visit   Medication Sig Dispense Refill    clotrimazole-betamethasone (Lotrisone) cream Apply 1 Application topically 2 times a day. 15 g 0    olmesartan-hydrochlorothiazide (BENIcar HCT) 40-25 mg tablet TAKE 1 TABLET BY MOUTH EVERY DAY 30  "tablet 0     No current facility-administered medications on file prior to visit.        No Known Allergies    Immunization History   Administered Date(s) Administered    Flu vaccine (IIV4), preservative free *Check age/dose* 11/14/2020, 11/02/2021, 11/12/2022, 11/15/2023    Influenza, Unspecified 11/26/2022    Influenza, seasonal, injectable 11/08/2006, 11/14/2007    Moderna SARS-CoV-2 Vaccination 03/18/2021, 04/15/2021, 12/01/2021    Novel influenza-H1N1-09, preservative-free 12/22/2009    Pfizer COVID-19 vaccine, bivalent, age 12 years and older (30 mcg/0.3 mL) 11/12/2022, 11/26/2022    Tdap vaccine, age 7 year and older (BOOSTRIX, ADACEL) 03/03/2021    Zoster vaccine, recombinant, adult (SHINGRIX) 07/03/2023, 11/15/2023         Review of Systems  All pertinent positive symptoms are included in the history of present illness.  All other systems have been reviewed and are negative and noncontributory to this patient's current ailments.     Objective   /70 (BP Location: Left arm, Patient Position: Sitting, BP Cuff Size: Adult)   Pulse 76   Ht 1.695 m (5' 6.75\")   Wt 75.5 kg (166 lb 6.4 oz)   SpO2 98%   BMI 26.26 kg/m²   BSA: 1.89 meters squared  Lab on 11/18/2024   Component Date Value Ref Range Status    Prostate Specific AG 11/18/2024 4.15 (H)  <=4.00 ng/mL Final    Thyroid Stimulating Hormone 11/18/2024 2.32  0.44 - 3.98 mIU/L Final    Cholesterol 11/18/2024 184  0 - 199 mg/dL Final          Age      Desirable   Borderline High   High     0-19 Y     0 - 169       170 - 199     >/= 200    20-24 Y     0 - 189       190 - 224     >/= 225         >24 Y     0 - 199       200 - 239     >/= 240   **All ranges are based on fasting samples. Specific   therapeutic targets will vary based on patient-specific   cardiac risk.    Pediatric guidelines reference:Pediatrics 2011, 128(S5).Adult guidelines reference: NCEP ATPIII Guidelines,DAVID 2001, 258:2486-59    Venipuncture immediately after or during the " administration of Metamizole may lead to falsely low results. Testing should be performed immediately prior to Metamizole dosing.    HDL-Cholesterol 11/18/2024 63.2  mg/dL Final      Age       Very Low   Low     Normal    High    0-19 Y    < 35      < 40     40-45     ----  20-24 Y    ----     < 40      >45      ----        >24 Y      ----     < 40     40-60      >60      Cholesterol/HDL Ratio 11/18/2024 2.9   Final      Ref Values  Desirable  < 3.4  High Risk  > 5.0    LDL Calculated 11/18/2024 106 (H)  <=99 mg/dL Final                                Near   Borderline      AGE      Desirable  Optimal    High     High     Very High     0-19 Y     0 - 109     ---    110-129   >/= 130     ----    20-24 Y     0 - 119     ---    120-159   >/= 160     ----      >24 Y     0 -  99   100-129  130-159   160-189     >/=190      VLDL 11/18/2024 15  0 - 40 mg/dL Final    Triglycerides 11/18/2024 75  0 - 149 mg/dL Final    Age              Desirable        Borderline         High        Very High  SEX:B           mg/dL             mg/dL               mg/dL      mg/dL  <=14D                       ----               ----        ----  15D-365D                    ----               ----        ----  1Y-9Y           0-74               75-99             >=100       ----  10Y-19Y        0-89                            >=130       ----  20Y-24Y        0-114             115-149             >=150      ----  >= 25Y         0-149             150-199             200-499    >=500      Venipuncture immediately after or during the administration of Metamizole may lead to falsely low results. Testing should be performed immediately prior to Metamizole dosing.    Non HDL Cholesterol 11/18/2024 121  0 - 149 mg/dL Final          Age       Desirable   Borderline High   High     Very High     0-19 Y     0 - 119       120 - 144     >/= 145    >/= 160    20-24 Y     0 - 149       150 - 189     >/= 190      ----         >24 Y    30  mg/dL above LDL Cholesterol goal      Glucose 11/18/2024 72 (L)  74 - 99 mg/dL Final    Sodium 11/18/2024 138  136 - 145 mmol/L Final    Potassium 11/18/2024 4.0  3.5 - 5.3 mmol/L Final    Chloride 11/18/2024 101  98 - 107 mmol/L Final    Bicarbonate 11/18/2024 29  21 - 32 mmol/L Final    Anion Gap 11/18/2024 12  10 - 20 mmol/L Final    Urea Nitrogen 11/18/2024 20  6 - 23 mg/dL Final    Creatinine 11/18/2024 1.03  0.50 - 1.30 mg/dL Final    eGFR 11/18/2024 84  >60 mL/min/1.73m*2 Final    Calculations of estimated GFR are performed using the 2021 CKD-EPI Study Refit equation without the race variable for the IDMS-Traceable creatinine methods.  https://jasn.asnjournals.org/content/early/2021/09/22/ASN.9934007932    Calcium 11/18/2024 9.0  8.6 - 10.3 mg/dL Final    Albumin 11/18/2024 4.0  3.4 - 5.0 g/dL Final    Alkaline Phosphatase 11/18/2024 56  33 - 120 U/L Final    Total Protein 11/18/2024 6.7  6.4 - 8.2 g/dL Final    AST 11/18/2024 18  9 - 39 U/L Final    Bilirubin, Total 11/18/2024 0.6  0.0 - 1.2 mg/dL Final    ALT 11/18/2024 20  10 - 52 U/L Final    Patients treated with Sulfasalazine may generate falsely decreased results for ALT.    WBC 11/18/2024 6.3  4.4 - 11.3 x10*3/uL Final    nRBC 11/18/2024 0.0  0.0 - 0.0 /100 WBCs Final    RBC 11/18/2024 4.87  4.50 - 5.90 x10*6/uL Final    Hemoglobin 11/18/2024 12.9 (L)  13.5 - 17.5 g/dL Final    Hematocrit 11/18/2024 40.1 (L)  41.0 - 52.0 % Final    MCV 11/18/2024 82  80 - 100 fL Final    MCH 11/18/2024 26.5  26.0 - 34.0 pg Final    MCHC 11/18/2024 32.2  32.0 - 36.0 g/dL Final    RDW 11/18/2024 15.4 (H)  11.5 - 14.5 % Final    Platelets 11/18/2024 273  150 - 450 x10*3/uL Final    Neutrophils % 11/18/2024 56.0  40.0 - 80.0 % Final    Immature Granulocytes %, Automated 11/18/2024 0.3  0.0 - 0.9 % Final    Immature Granulocyte Count (IG) includes promyelocytes, myelocytes and metamyelocytes but does not include bands. Percent differential counts (%) should be interpreted  "in the context of the absolute cell counts (cells/UL).    Lymphocytes % 11/18/2024 29.4  13.0 - 44.0 % Final    Monocytes % 11/18/2024 10.8  2.0 - 10.0 % Final    Eosinophils % 11/18/2024 2.4  0.0 - 6.0 % Final    Basophils % 11/18/2024 1.1  0.0 - 2.0 % Final    Neutrophils Absolute 11/18/2024 3.53  1.20 - 7.70 x10*3/uL Final    Percent differential counts (%) should be interpreted in the context of the absolute cell counts (cells/uL).    Immature Granulocytes Absolute, Au* 11/18/2024 0.02  0.00 - 0.70 x10*3/uL Final    Lymphocytes Absolute 11/18/2024 1.85  1.20 - 4.80 x10*3/uL Final    Monocytes Absolute 11/18/2024 0.68  0.10 - 1.00 x10*3/uL Final    Eosinophils Absolute 11/18/2024 0.15  0.00 - 0.70 x10*3/uL Final    Basophils Absolute 11/18/2024 0.07  0.00 - 0.10 x10*3/uL Final    Hemoglobin A1C 11/18/2024 5.5  See comment % Final    Estimated Average Glucose 11/18/2024 111  Not Established mg/dL Final       Physical Exam  CONSTITUTIONAL - well nourished, well developed, looks like stated age, in no acute distress, not ill-appearing, and not tired appearing  SKIN - normal skin color and pigmentation, normal skin turgor without rash, 2-3 mm skin lesion noted on top of nose that appear to be \"stuck on\" but slightly pearly  HEAD - no trauma, normocephalic  EYES - normal external exam  ENT - TM's intact, no injection, no signs of infection, uvula midline, normal tongue movement and throat normal, no exudate  NECK - supple without rigidity, no neck mass was observed, no thyromegaly or thyroid nodules  CHEST - clear to auscultation, no wheezing, no crackles and no rales, good effort  CARDIAC - regular rate and regular rhythm, no skipped beats, no murmur  ABDOMEN - no organomegaly, soft, nontender, nondistended, no guarding/rebound/rigidity  EXTREMITIES - no edema, no deformities, right shoulder limited range of motion secondary to pain, AC joint impingement noted  NEUROLOGICAL - normal gait, normal balance, normal " motor, no ataxia, alert, oriented and no focal signs  PSYCHIATRIC - alert, pleasant and cordial, age-appropriate  IMMUNOLOGIC - no cervical lymphadenopathy     Assessment/Plan   Health Maintenance   Complete history and physical examination was performed  EKG reveals normal sinus rhythm without acute changes  We will notify of test results once available and make treatment recommendations accordingly  Colon cancer screening due 2027    1. Hypertension  Continue on olmesartan-hydrochlorothiazide 40-25mg  Your prescription has been sent to your pharmacy  I would like to have you monitor and record blood pressures at home   Blood pressure goal should be below 130/80, ideally 120/80     2. Hyperlipidemia  Continues to be stable  We will continue monitoring closely  CT cardiac score noted a value of 0 in the past  The 10-year ASCVD risk score (Nunu VARELA, et al., 2019) is: 5.5%    Values used to calculate the score:      Age: 59 years      Sex: Male      Is Non- : No      Diabetic: No      Tobacco smoker: No      Systolic Blood Pressure: 110 mmHg      Is BP treated: Yes      HDL Cholesterol: 63.2 mg/dL      Total Cholesterol: 184 mg/dL  Cardiovascular risk discussed and, if needed, lifestyle modifications recommended, including nutritional choices, exercise, and elimination of habits contributing to risk.    We agreed on a plan to reduce the current cardiovascular risk      3.  Elevated PSA  Please have this repeated within the next week  If this continues to be elevated, a requisition to follow-up with urology was placed in the chart to be done at your earliest mutual convenience    4.  Anemia  Will have this repeated within the next week  If this continues to be low we will discuss this further    5.  Right shoulder pain  I truly believe that this could be due to a rotator cuff injury or even an impingement syndrome  I did recommend physical therapy or even an x-ray to start and ultimately to  discuss a possible cortisone injection  We also discussed the option of an MRI cash price of $400  It was stated that you will consider what to do next at this time    6.  Snoring  I did offer an at home sleep study but at this time he declined and we will continue monitoring and discuss this further in the near future

## 2024-11-27 ENCOUNTER — LAB (OUTPATIENT)
Dept: LAB | Facility: LAB | Age: 59
End: 2024-11-27
Payer: COMMERCIAL

## 2024-11-27 DIAGNOSIS — R97.20 ELEVATED PSA MEASUREMENT: ICD-10-CM

## 2024-11-27 DIAGNOSIS — D64.9 ANEMIA, UNSPECIFIED TYPE: ICD-10-CM

## 2024-11-27 LAB
ERYTHROCYTE [DISTWIDTH] IN BLOOD BY AUTOMATED COUNT: 14.4 % (ref 11.5–14.5)
HCT VFR BLD AUTO: 39.1 % (ref 41–52)
HGB BLD-MCNC: 12.8 G/DL (ref 13.5–17.5)
MCH RBC QN AUTO: 26.5 PG (ref 26–34)
MCHC RBC AUTO-ENTMCNC: 32.7 G/DL (ref 32–36)
MCV RBC AUTO: 81 FL (ref 80–100)
NRBC BLD-RTO: 0 /100 WBCS (ref 0–0)
PLATELET # BLD AUTO: 280 X10*3/UL (ref 150–450)
PSA SERPL-MCNC: 3.46 NG/ML
RBC # BLD AUTO: 4.83 X10*6/UL (ref 4.5–5.9)
WBC # BLD AUTO: 7.6 X10*3/UL (ref 4.4–11.3)

## 2024-11-27 PROCEDURE — 84153 ASSAY OF PSA TOTAL: CPT

## 2024-11-27 PROCEDURE — 36415 COLL VENOUS BLD VENIPUNCTURE: CPT

## 2024-11-27 PROCEDURE — 85027 COMPLETE CBC AUTOMATED: CPT

## 2024-11-29 NOTE — RESULT ENCOUNTER NOTE
Anemia still noted but overall stable  I understand that he did donate blood just prior to this so we will continue monitoring closely    Prostate did normalize now at 3.46 with the previous being at 4.15    I would recommend that we repeat this in 6 months at his next appointment and we will continue monitoring closely

## 2025-02-21 DIAGNOSIS — M25.511 ACUTE PAIN OF RIGHT SHOULDER: ICD-10-CM

## 2025-04-15 ENCOUNTER — APPOINTMENT (OUTPATIENT)
Dept: PRIMARY CARE | Facility: CLINIC | Age: 60
End: 2025-04-15
Payer: COMMERCIAL

## 2025-04-15 VITALS
OXYGEN SATURATION: 97 % | BODY MASS INDEX: 26.53 KG/M2 | DIASTOLIC BLOOD PRESSURE: 70 MMHG | SYSTOLIC BLOOD PRESSURE: 104 MMHG | HEART RATE: 101 BPM | WEIGHT: 169 LBS | HEIGHT: 67 IN

## 2025-04-15 DIAGNOSIS — Z13.29 SCREENING FOR THYROID DISORDER: ICD-10-CM

## 2025-04-15 DIAGNOSIS — Z13.0 SCREENING FOR DISORDER OF BLOOD AND BLOOD-FORMING ORGANS: ICD-10-CM

## 2025-04-15 DIAGNOSIS — K13.0 LIP DRYNESS: ICD-10-CM

## 2025-04-15 DIAGNOSIS — I10 HTN (HYPERTENSION), BENIGN: ICD-10-CM

## 2025-04-15 DIAGNOSIS — Z00.00 HEALTHCARE MAINTENANCE: Primary | ICD-10-CM

## 2025-04-15 DIAGNOSIS — Z13.6 SCREENING FOR HEART DISEASE: ICD-10-CM

## 2025-04-15 DIAGNOSIS — Z13.1 SCREENING FOR DIABETES MELLITUS: ICD-10-CM

## 2025-04-15 PROCEDURE — 1036F TOBACCO NON-USER: CPT | Performed by: STUDENT IN AN ORGANIZED HEALTH CARE EDUCATION/TRAINING PROGRAM

## 2025-04-15 PROCEDURE — 3078F DIAST BP <80 MM HG: CPT | Performed by: STUDENT IN AN ORGANIZED HEALTH CARE EDUCATION/TRAINING PROGRAM

## 2025-04-15 PROCEDURE — 3008F BODY MASS INDEX DOCD: CPT | Performed by: STUDENT IN AN ORGANIZED HEALTH CARE EDUCATION/TRAINING PROGRAM

## 2025-04-15 PROCEDURE — 3074F SYST BP LT 130 MM HG: CPT | Performed by: STUDENT IN AN ORGANIZED HEALTH CARE EDUCATION/TRAINING PROGRAM

## 2025-04-15 PROCEDURE — 99214 OFFICE O/P EST MOD 30 MIN: CPT | Performed by: STUDENT IN AN ORGANIZED HEALTH CARE EDUCATION/TRAINING PROGRAM

## 2025-04-15 RX ORDER — OLMESARTAN MEDOXOMIL AND HYDROCHLOROTHIAZIDE 40/25 40; 25 MG/1; MG/1
1 TABLET ORAL DAILY
Qty: 90 TABLET | Refills: 1 | Status: SHIPPED | OUTPATIENT
Start: 2025-04-15

## 2025-04-15 RX ORDER — CLOTRIMAZOLE AND BETAMETHASONE DIPROPIONATE 10; .64 MG/G; MG/G
1 CREAM TOPICAL 2 TIMES DAILY
Qty: 15 G | Refills: 0 | Status: SHIPPED | OUTPATIENT
Start: 2025-04-15

## 2025-04-15 NOTE — PROGRESS NOTES
Subjective   Patient ID: Melvin Kowalski is a 59 y.o. male who presents for Follow Up Right Shoulder.  Today he is accompanied by alone.     HPI    Right shoulder pain  Has has right shoulder pain on and off for the past 10 years.  Notices that it does limit his range of motion at times.    Most of the pain is localized around his AC joint.  There is no major improvement with exercise  [facility] with a high suspicion of a posterior labral tear   States that his shoulder pain has improved but is wondering what the next steps are.    2. Hypertension  Currently on olmesartan-HCTZ 40-25 mg daily  Denies any side effects of medication  Requesting for refill to be sent to their pharmacy     3. Hyperlipidemia  On no medications, last LDL 6/29/23 was 111, hba1c was 5.4  Had a CT cardiac score in 2021 with a value of 0 in the past as well    4.  Dry lips  Uses Lotrisone cream as needed  Requesting refill    5.  Itchy ears  Complains of itchiness in ears, like to get this checked today.    Current Outpatient Medications on File Prior to Visit   Medication Sig Dispense Refill    clotrimazole-betamethasone (Lotrisone) cream Apply 1 Application topically 2 times a day. 15 g 0    olmesartan-hydrochlorothiazide (BENIcar HCT) 40-25 mg tablet Take 1 tablet by mouth once daily. 90 tablet 1     No current facility-administered medications on file prior to visit.        No Known Allergies    Immunization History   Administered Date(s) Administered    Flu vaccine (IIV4), preservative free *Check age/dose* 11/14/2020, 11/02/2021, 11/12/2022, 11/15/2023    Flu vaccine, trivalent, preservative free, age 6 months and greater (Fluarix/Fluzone/Flulaval) 11/20/2024    Influenza, Unspecified 11/26/2022    Influenza, seasonal, injectable 11/08/2006, 11/14/2007    Moderna SARS-CoV-2 Vaccination 03/18/2021, 04/15/2021, 12/01/2021    Novel influenza-H1N1-09, preservative-free 12/22/2009    Pfizer COVID-19 vaccine, bivalent, age 12  "years and older (30 mcg/0.3 mL) 11/12/2022, 11/26/2022    Tdap vaccine, age 7 year and older (BOOSTRIX, ADACEL) 03/03/2021    Zoster vaccine, recombinant, adult (SHINGRIX) 07/03/2023, 11/15/2023         Review of Systems  All pertinent positive symptoms are included in the history of present illness.  All other systems have been reviewed and are negative and noncontributory to this patient's current ailments.     Objective   /70 (BP Location: Left arm, Patient Position: Sitting, BP Cuff Size: Adult)   Pulse 101   Ht 1.695 m (5' 6.75\")   Wt 76.7 kg (169 lb)   SpO2 97%   BMI 26.67 kg/m²   BSA: 1.9 meters squared  No visits with results within 1 Month(s) from this visit.   Latest known visit with results is:   Lab on 11/27/2024   Component Date Value Ref Range Status    WBC 11/27/2024 7.6  4.4 - 11.3 x10*3/uL Final    nRBC 11/27/2024 0.0  0.0 - 0.0 /100 WBCs Final    RBC 11/27/2024 4.83  4.50 - 5.90 x10*6/uL Final    Hemoglobin 11/27/2024 12.8 (L)  13.5 - 17.5 g/dL Final    Hematocrit 11/27/2024 39.1 (L)  41.0 - 52.0 % Final    MCV 11/27/2024 81  80 - 100 fL Final    MCH 11/27/2024 26.5  26.0 - 34.0 pg Final    MCHC 11/27/2024 32.7  32.0 - 36.0 g/dL Final    RDW 11/27/2024 14.4  11.5 - 14.5 % Final    Platelets 11/27/2024 280  150 - 450 x10*3/uL Final    Prostate Specific AG 11/27/2024 3.46  <=4.00 ng/mL Final       Physical Exam  CONSTITUTIONAL - well nourished, well developed, looks like stated age, in no acute distress, not ill-appearing, and not tired appearing  SKIN - normal skin color and pigmentation, normal skin turgor without rash, lesions, or nodules visualized  HEAD - no trauma, normocephalic  EYES - pupils are equal and reactive to light, extraocular muscles are intact, and normal external exam  ENT - TM's intact, dry skin present along canals no injection, no signs of infection, uvula midline, normal tongue movement and throat normal, no exudate, nasal passage without discharge and patent  NECK " - supple without rigidity, no neck mass was observed,   LUNG - clear to auscultation, no wheezing, no crackles and no rales, good effort  CARDIAC - regular rate and regular rhythm, no skipped beats, no murmur  ABDOMEN - no organomegaly, soft, nontender, nondistended, normal bowel sounds  EXTREMITIES - no edema, no deformities  NEUROLOGICAL - normal gait, normal balance, normal motor, alert, oriented and no focal signs  PSYCHIATRIC - alert, pleasant and cordial, age-appropriate      Assessment/Plan   1.  Right shoulder pain  Discussed a referral for physical therapy, versus injection if needed.  Patient not currently having pain at this time.  Would like to continue to monitor.    2. Hypertension  Continue on olmesartan-hydrochlorothiazide 40-25mg  Your prescription has been sent to your pharmacy  I would like to have you monitor and record blood pressures at home   Blood pressure goal should be below 130/80, ideally 120/80     3. Hyperlipidemia  Continues to be stable  We will continue monitoring closely  CT cardiac score noted a value of 0 in the past    4.  Dry lips  Uses Lotrisone cream as needed  Refill sent to pharmacy    5.  Eczema of ears  Please let us know if your symptoms worsen we can prescribe fluocinolone eardrops to help with your itchiness.  Will continue to monitor    We will continue to follow-up in 6 months or sooner if needed.